# Patient Record
Sex: MALE | Race: WHITE | NOT HISPANIC OR LATINO | Employment: FULL TIME | URBAN - NONMETROPOLITAN AREA
[De-identification: names, ages, dates, MRNs, and addresses within clinical notes are randomized per-mention and may not be internally consistent; named-entity substitution may affect disease eponyms.]

---

## 2020-02-08 ENCOUNTER — HOSPITAL ENCOUNTER (EMERGENCY)
Facility: HOSPITAL | Age: 30
Discharge: HOME | End: 2020-02-08
Attending: STUDENT IN AN ORGANIZED HEALTH CARE EDUCATION/TRAINING PROGRAM | Admitting: STUDENT IN AN ORGANIZED HEALTH CARE EDUCATION/TRAINING PROGRAM

## 2020-02-08 VITALS
HEART RATE: 89 BPM | SYSTOLIC BLOOD PRESSURE: 168 MMHG | TEMPERATURE: 98.2 F | DIASTOLIC BLOOD PRESSURE: 84 MMHG | RESPIRATION RATE: 18 BRPM | WEIGHT: 173.6 LBS | HEIGHT: 65 IN | BODY MASS INDEX: 28.92 KG/M2 | OXYGEN SATURATION: 99 %

## 2020-02-08 DIAGNOSIS — Z48.89 ENCOUNTER FOR POSTOPERATIVE WOUND CHECK: Primary | ICD-10-CM

## 2020-02-08 PROCEDURE — 99282 EMERGENCY DEPT VISIT SF MDM: CPT

## 2020-02-08 RX ORDER — CEPHALEXIN 250 MG/1
250 CAPSULE ORAL 4 TIMES DAILY
COMMUNITY

## 2020-02-08 RX ORDER — OXYCODONE HYDROCHLORIDE AND ACETAMINOPHEN 5; 325 MG/1; MG/1
1 TABLET ORAL EVERY 6 HOURS PRN
COMMUNITY

## 2020-02-09 NOTE — DISCHARGE INSTRUCTIONS
Keep the wound clean and dry as we discussed.  Still use your splint to keep finger immobilized while it heals.

## 2020-02-09 NOTE — ED PROVIDER NOTES
Subjective   Patient is a 29-year-old male that presents with concerns for postoperative infection to his left index finger.  Patient states that he had surgery at [facility] 8 days ago where they did repair arteries on 1 side of his finger where he was cut off with a chainsaw.  Patient and wife state there was post to be notified within 3 days of their procedure when a follow-up appointment was.  States they have kept the dressing covered over the wound and has not aerated out.  She is concerned because the flesh looks white underneath the dressing.  They state they still have not heard from the [facility] [place], but they have not attempted to contact them either.  He has been wearing the splint and followed other instructions.  He is still currently taking antibiotics as instructed.          Review of Systems   All other systems reviewed and are negative.      Past Medical History:   Diagnosis Date   • Chest pain        Allergies   Allergen Reactions   • Penicillins        History reviewed. No pertinent surgical history.    Family History   Problem Relation Age of Onset   • Hypertension Mother    • Seizures Mother    • Hypertension Father    • Heart disease Father        Social History     Socioeconomic History   • Marital status:      Spouse name: Not on file   • Number of children: Not on file   • Years of education: Not on file   • Highest education level: Not on file   Tobacco Use   • Smoking status: Current Every Day Smoker     Packs/day: 1.00     Types: Cigarettes   Substance and Sexual Activity   • Alcohol use: Yes     Comment: occasional           Objective   Physical Exam   Nursing note and vitals reviewed.      GEN: No acute distress  Head: Normocephalic, atraumatic  Eyes: Pupils equal round reactive to light  ENT: Posterior pharynx normal in appearance, oral mucosa is moist  Chest: Nontender to palpation  Cardiovascular: Regular rate  Lungs: Clear to auscultation  bilaterally  Abdomen: Soft, nontender, nondistended, no peritoneal signs  Extremities: Patient surgical wound is still intact with stitches still in place.  The flesh around the wound is white it appears that it has been In moisture for prolonged period of time.  There is no erythema or evidence of cellulitis.  He does have good capillary refill to the end of the left index finger.  There is no purulent drainage.  There is no drainage at all.  Neuro: GCS 15  Psych: Mood and affect are appropriate      Procedures           ED Course                                               MDM  Number of Diagnoses or Management Options  Encounter for postoperative wound check:   Diagnosis management comments: Did give patient new wound care instructions.  Instruct him to continue to wear the splint for mobility while the surgical wound healed.  I did state the need to keep it dry.  I did stress to him that I am cannot concern for infection at this point but I am concerned that the stitches may not take because the flesh is so weak where it is been held under moisture.  I did apply sterile gauze as well as tape over the area to protect it but allow air to get to it.  He was then placed back in his splint.  Did instruct him as well that he needed to call [facility] to set up a follow-up appointment.  I also gave him Dr. Deal's information in case he could not get in in a timely fashion.       Amount and/or Complexity of Data Reviewed  Decide to obtain previous medical records or to obtain history from someone other than the patient: yes  Obtain history from someone other than the patient: yes  Review and summarize past medical records: yes        Final diagnoses:   Encounter for postoperative wound check            Placido Ruiz MD  02/08/20 2037